# Patient Record
Sex: FEMALE | Race: WHITE | NOT HISPANIC OR LATINO | URBAN - NONMETROPOLITAN AREA
[De-identification: names, ages, dates, MRNs, and addresses within clinical notes are randomized per-mention and may not be internally consistent; named-entity substitution may affect disease eponyms.]

---

## 2024-01-30 ENCOUNTER — OFFICE VISIT (OUTPATIENT)
Dept: PRIMARY CARE | Facility: CLINIC | Age: 39
End: 2024-01-30
Payer: COMMERCIAL

## 2024-01-30 VITALS
WEIGHT: 128.4 LBS | DIASTOLIC BLOOD PRESSURE: 60 MMHG | BODY MASS INDEX: 21.7 KG/M2 | HEART RATE: 90 BPM | OXYGEN SATURATION: 99 % | SYSTOLIC BLOOD PRESSURE: 118 MMHG

## 2024-01-30 DIAGNOSIS — M70.42 PREPATELLAR BURSITIS OF LEFT KNEE: Primary | ICD-10-CM

## 2024-01-30 DIAGNOSIS — K22.719 BARRETT'S ESOPHAGUS WITH DYSPLASIA: ICD-10-CM

## 2024-01-30 PROBLEM — G24.01 DYSKINESIA, TARDIVE: Status: ACTIVE | Noted: 2024-01-30

## 2024-01-30 PROBLEM — M71.10 INFECTION OF BURSA: Status: ACTIVE | Noted: 2024-01-30

## 2024-01-30 PROBLEM — K22.70 BARRETT'S ESOPHAGUS: Status: ACTIVE | Noted: 2024-01-30

## 2024-01-30 PROBLEM — N92.0 MENORRHAGIA: Status: ACTIVE | Noted: 2024-01-30

## 2024-01-30 PROBLEM — D50.9 IRON DEFICIENCY ANEMIA: Status: ACTIVE | Noted: 2024-01-30

## 2024-01-30 PROCEDURE — 99213 OFFICE O/P EST LOW 20 MIN: CPT | Performed by: FAMILY MEDICINE

## 2024-01-30 PROCEDURE — 1036F TOBACCO NON-USER: CPT | Performed by: FAMILY MEDICINE

## 2024-01-30 RX ORDER — ZINC GLUCONATE 50 MG
50 TABLET ORAL DAILY
COMMUNITY

## 2024-01-30 RX ORDER — FERROUS SULFATE 325(65) MG
65 TABLET, DELAYED RELEASE (ENTERIC COATED) ORAL
COMMUNITY

## 2024-01-30 NOTE — PROGRESS NOTES
Subjective   Patient ID: Debby Corbett is a 38 y.o. female who presents for Knee Pain (Fluid pocket ).    Knee Pain          Getting swelling on her knee  - left bursa  Has had this in the past too  -   Mild  -   Mildly tender   No erythema     Does house work on her knees    Review of chart - hx of Barretts esophagus -   Due for rescope this year     Tardive dyskinesia         Review of Systems    Objective   /60   Pulse 90   Wt 58.2 kg (128 lb 6.4 oz)   SpO2 99%   BMI 21.70 kg/m²     Physical Exam  Vitals reviewed.   Constitutional:       Comments: Walks with stiffened legs and left arm is held in clenched position that she can unclench    HENT:      Head: Normocephalic and atraumatic.   Pulmonary:      Effort: Pulmonary effort is normal.   Skin:     Comments: Very soft left knee bursa - non tender and no erythema    Neurological:      Mental Status: She is alert.         Assessment/Plan   Problem List Items Addressed This Visit             ICD-10-CM       Medium    Varghese's esophagus K22.70     Other Visit Diagnoses         Codes    Prepatellar bursitis of left knee    -  Primary M70.42        Discussed conservative tx of bursitis  -   Ace and short term nsaid     Urged EGD again - she wants to think on it     We discussed at visit any disease processes that were of concern as well as the risks, benefits and instructions of any new medication provided.    See orders and discussion section for information handed to patient on their Clinical Summary.   Patient (and/or caretaker of patient if present)  stated all questions were answered, and they voiced understanding of instructions.

## 2024-01-30 NOTE — PATIENT INSTRUCTIONS
This is bursitis  -  a swelling of the bursa on the knee -   That comes from kneeling on hard surfaces      To treat it at this stage -  you use an ACE wrap and take an anti-inflammatory for 1 - 2 weeks -   Aleve  twice a day with food -        Wear knee pads when on your knees       You are due to go back in to recheck your esophagus because Barretts Esophagus can turn to cancer - when you are willing to do that - leave me a message and I will place a referral.

## 2024-02-19 ENCOUNTER — APPOINTMENT (OUTPATIENT)
Dept: PRIMARY CARE | Facility: CLINIC | Age: 39
End: 2024-02-19
Payer: COMMERCIAL